# Patient Record
Sex: MALE | Race: WHITE | NOT HISPANIC OR LATINO | URBAN - METROPOLITAN AREA
[De-identification: names, ages, dates, MRNs, and addresses within clinical notes are randomized per-mention and may not be internally consistent; named-entity substitution may affect disease eponyms.]

---

## 2017-10-09 ENCOUNTER — TRANSFERRED RECORDS (OUTPATIENT)
Dept: HEALTH INFORMATION MANAGEMENT | Facility: CLINIC | Age: 22
End: 2017-10-09

## 2017-10-10 ENCOUNTER — MEDICAL CORRESPONDENCE (OUTPATIENT)
Dept: HEALTH INFORMATION MANAGEMENT | Facility: CLINIC | Age: 22
End: 2017-10-10

## 2017-10-11 NOTE — TELEPHONE ENCOUNTER
APPT INFORMATION    Date & Time: 10/18/17 8:30AM   Reason for Appt: Rt Shoulder Impingement    Referring Name/Clinic: Dr. Moi Sinha - [facility]    Yes / No COMMENT / NOTES DATE & ACTION   Patient Contacted? no Pt is referred        RECORDS CLINIC NAME  (use N/A if no records ) DATE & ACTION RECEIVED RECS & IMG? Y/N   (may include other helpful notes)   External Clinics: [facility] 10/11/17 Faxed request for records    10/11/17 2:06pm - Records scanned in Epic under MEDIA Tab. Notified Leisa to pull XR R Shoulder 10/9/17 Records received.     Internal Clinics: n/a

## 2017-10-14 ENCOUNTER — HEALTH MAINTENANCE LETTER (OUTPATIENT)
Age: 22
End: 2017-10-14

## 2017-10-14 ASSESSMENT — ENCOUNTER SYMPTOMS
JOINT SWELLING: 0
BACK PAIN: 0
MUSCLE WEAKNESS: 1
MYALGIAS: 0
NECK PAIN: 0
STIFFNESS: 1
MUSCLE CRAMPS: 0
ARTHRALGIAS: 1

## 2017-10-18 ENCOUNTER — OFFICE VISIT (OUTPATIENT)
Dept: ORTHOPEDICS | Facility: CLINIC | Age: 22
End: 2017-10-18

## 2017-10-18 ENCOUNTER — PRE VISIT (OUTPATIENT)
Dept: ORTHOPEDICS | Facility: CLINIC | Age: 22
End: 2017-10-18

## 2017-10-18 VITALS — HEIGHT: 74 IN | WEIGHT: 221.2 LBS | BODY MASS INDEX: 28.39 KG/M2

## 2017-10-18 DIAGNOSIS — M25.611 STIFFNESS OF RIGHT SHOULDER JOINT: Primary | ICD-10-CM

## 2017-10-18 NOTE — MR AVS SNAPSHOT
After Visit Summary   10/18/2017    Kavon Mishra    MRN: [id number]           Patient Information     Date Of Birth          1995        Visit Information        Provider Department      10/18/2017 8:30 AM Aislinn Zapien MD [facility]        Today's Diagnoses     Stiffness of right shoulder joint    -  1       Follow-ups after your visit        Additional Services     PHYSICAL THERAPY REFERRAL (Internal)       Physical Therapy Referral                  Your next 10 appointments already scheduled     Nov 07, 2017  5:30 PM CST   (Arrive by 5:15 PM)   MR UPPER EXTREMITY JOINT RIGHT W/O CONTRAST with [id number]   [facility] ([facility])    [address]  [facility] [address] [phone]           Take your medicines as usual, unless your doctor tells you not to. Bring a list of your current medicines to your exam (including vitamins, minerals and over-the-counter drugs). Also bring the results of similar scans you may have had.  Please remove any body piercings and hair extensions before you arrive.  Follow your doctor s orders. If you do not, we may have to postpone your exam.  You will not have contrast for this exam. You do not need to do anything special to prepare.  The MRI machine uses a strong magnet. Please wear clothes without metal (snaps, zippers). A sweatsuit works well, or we may give you a hospital gown.   **IMPORTANT** THE INSTRUCTIONS BELOW ARE ONLY FOR THOSE PATIENTS WHO HAVE BEEN TOLD THEY WILL RECEIVE SEDATION OR GENERAL ANESTHESIA DURING THEIR MRI PROCEDURE:  IF YOU WILL RECEIVE SEDATION (take medicine to help you relax during your exam):   You must get the medicine from your doctor before you arrive. Bring the medicine to the exam. Do not take it at home.   Arrive one hour early. Bring someone who can take you home after the test. Your medicine will make you sleepy. After the exam,  you may not drive, take a bus or take a taxi by yourself.   No eating 8 hours before your exam. You may have clear liquids up until 4 hours before your exam. (Clear liquids include water, clear tea, black coffee and fruit juice without pulp.)  IF YOU WILL RECEIVE ANESTHESIA (be asleep for your exam):   Arrive 1 1/2 hours early. Bring someone who can take you home after the test. You may not drive, take a bus or take a taxi by yourself.   No eating 8 hours before your exam. You may have clear liquids up until 4 hours before your exam. (Clear liquids include water, clear tea, black coffee and fruit juice without pulp.)   You will spend four to five hours in the recovery room.  Please call the Imaging Department at your exam site with any questions.            Nov 09, 2017 10:00 AM CST   BREND[facility] Extremity with LIANNA Cao [facility] ([facility])    [address] [place] [address] [phone]            Nov 16, 2017 10:00 AM CST   [facility] Extremity with LIANNA Cao [facility] ([facility])    [address] [place] [address] [phone]              Who to contact     Please call your clinic at [phone] to:    Ask questions about your health    Make or cancel appointments    Discuss your medicines    Learn about your test results    Speak to your doctor   If you have compliments or concerns about an experience at your clinic, or if you wish to file a complaint, please contact [facility] Patient Relations at [phone] or email us at [email or web address]         Additional Information About Your Visit        MyChart Information     [employer] gives you secure access to your electronic health record. If you see a primary care provider, you can also send messages to your care team and make appointments. If you  "have questions, please call your primary care clinic.  If you do not have a primary care provider, please call [phone] and they will assist you.      [employer] is an electronic gateway that provides easy, online access to your medical records. With [employer], you can request a clinic appointment, read your test results, renew a prescription or communicate with your care team.     To access your existing account, please contact your [facility] or call [phone] for assistance.        Care EveryWhere ID     This is your Care EveryWhere ID. This could be used by other organizations to access your [facility] medical records  [id number]        Your Vitals Were     Height BMI (Body Mass Index)                1.88 m (6' 2\") 28.4 kg/m2           Blood Pressure from Last 3 Encounters:   No data found for BP    Weight from Last 3 Encounters:   10/18/17 100.3 kg (221 lb 3.2 oz)              We Performed the Following     PHYSICAL THERAPY REFERRAL (Internal)        Primary Care Provider    None Specified       [facility] [address] [address]        Equal Access to Services     [facility]: Hadii aad ku hadasho Soomaali, waaxda luqadaha, qaybta kaalmada adeegyada, waxay aurorain hayjocelyne fontaine . So [facility] [phone].    ATENCIÓN: Si habla español, tiene a moffett disposición servicios gratuitos de asistencia lingüística. Llame al [phone].    We comply with applicable federal civil rights laws and [place] laws. We do not discriminate on the basis of race, color, national origin, age, disability, sex, sexual orientation, or gender identity.            Thank you!     Thank you for choosing [facility]THOP[facility]  for your care. Our goal is always to provide you with excellent care. Hearing back from our patients is one way we can continue to improve our services. Please take a few minutes to complete the written survey that you may receive in the mail " after your visit with us. Thank you!             Your Updated Medication List - Protect others around you: Learn how to safely use, store and throw away your medicines at www.disposemymeds.org.      Notice  As of 10/18/2017 11:59 PM    You have not been prescribed any medications.

## 2017-10-18 NOTE — LETTER
Verification of Appointment  2017     Seen today: yes    Patient:  Kavon Mishra  :   1995  Physician: Aislinn Zapien MD      Kavon Mishra is under our care for his right shoulder.   He is undergoing directed physical therapy for treatment of the right shoulder limitations.  Current restrictions include: No shoulder height or overhead activity. No push-ups, pull-ups      Aislinn Zapien MD

## 2017-10-18 NOTE — LETTER
10/18/2017      RE: Kavon Mishra  [address] [address] [place] [id number]       [facility], Orthopaedic Surgery Consultation    Kavon Mishra MRN# [id number]   Age: 21 year old YOB: 1995     Reason for consult: Right shoulder pain       Requesting physician: Moi Sinha MD         Assessment and Plan:   Assessment:   1. Right shoulder stiffness    Plan:  We reviewed the history, imaging and physical exam findings with the patient and described them using lay language. We discussed the finding of right shoulder stiffness on today's exam. The patient's age and history make both impingement and rotator cuff pathology very unlikely. The patient has significant shoulder stiffness with both active and passive ROM, which is likely contributing to his pain. We cannot rule out labral or biceps pathology, which would require an MR arthrogram to determine. However, we would not likely address either of these issues in the context of his profound stiffness which will require dedicated PT first. Pain will likely improve with dedicated PT for stiffness. If he does not improve with PT, we can perform advanced imaging at that time. He was provided a note including limitations for [facility]. The assessment and plan was discussed with the patient who understands and agrees.    Dalton Holbrook MD  Orthopedic Surgery, PGY-1    Patient was seen and discussed with Dr. Zapien            History of Present Illness:   Chief Concern: Right shoulder pain, loss of ROM    This is an otherwise healthy RHD male who presents with right shoulder pain since the first week in August with progressive loss of shoulder ROM and increasing pain. He is in [facility] and participated in  training in July, pain started about a week after returning home. Patient denies trauma or injury to the shoulder, but the patient was doing a lot of upper body exercise and lifting throughout training. He initially treated the shoulder  with rest, ice, and NSAIDs with minimal relief. He noticed worsening ROM and was evaluated at [facility], who recommended 1 month of PT. He denies improvement from PT with no change in his ROM or pain level. PT was done at [place] and consisted of Codman's exercises and some rotator cuff strengthening but not much in the way of stretching per pt. He has never received an injection into the shoulder. Denies any history of shoulder trauma or prior pain, no history of dislocation or problems with the shoulder (played tennis and hockey in high school). On 1 finger test, the patient points to his anterior glenohumeral joint as the primary area of pain. He has pain with any ROM of the shoulder above his waist.     Denies history of diabetes or thyroid disease. Patient is in [employer] and will be in the [employer] after graduating. Hopes to go to [employer].       Patient was seen and examined by me. History, PMH, Meds, SH, complete ROS (10 organ systems) and PE reviewed with patient and prior medical records.         Past Medical History:   No past medical history on file.          Past Surgical History:   No past surgical history on file.          Social History:     Social History     Social History     Marital status: Single     Spouse name: N/A     Number of children: N/A     Years of education: N/A     Social History Main Topics     Smoking status: Not on file     Smokeless tobacco: Not on file     Alcohol use Not on file     Drug use: Not on file     Sexual activity: Not on file     Other Topics Concern     Not on file     Social History Narrative             Family History:   No family history on file.           Medications:     No current outpatient prescriptions on file.     No current facility-administered medications for this visit.              Allergies:    Not on File         Review of Systems:   A comprehensive 10 point review of systems (constitutional, ENT, cardiac, peripheral vascular, respiratory,  "GI, , Musculoskeletal, skin, Neurological) was performed and found to be negative except as described in this note.           Physical Exam:   COMPLETE EXAMINATION:   VITAL SIGNS: Ht 1.88 m (6' 2\")  Wt 100.3 kg (221 lb 3.2 oz)  BMI 28.4 kg/m2  RESP: Non labored breathing  MUSCULOSKELETAL:.      RUE:  Skin intact. No erythema. No prior scars. No shoulder asymmetry compared with contralateral shoulder. No muscle atrophy. TTP over AC joint, coracoid process, and anterior and lateral shoulder. No TTP over clavicle, arm, elbow, forearm. SILT in axillary, musculocutaneous, median, ulnar, and radial nerve distributions. EPL, FPL, and intrinsics intact. AAROM shoulder , ER 65, IR to belt, and abduction 80 with discomfort at end range (he is resistant to attempting passive ROM beyond this due to pain). Patient painful with most provocative maneuvers due to shoulder stiffness - pain with empty can, resisted external rotation, Speed's, Yergason and Myers with preserved strength throughout. Radial pulse palpable.     LUE: Active shoulder ROM , ER 85, Abd 110, IR to T6.          Data:   Imaging:  XR R shoulder: no osseous abnormality.      I have personally examined this patient and have reviewed the clinical presentation and progress note with the resident.  I agree with the treatment plan as outlined.  The plan was formulated with the resident on the day of the resident's note.       Aislinn Zapien MD      "

## 2017-10-18 NOTE — NURSING NOTE
"Reason For Visit:   Chief Complaint   Patient presents with     Consult     Righ shoulder pain, limited ROM and instability.        PCP: No primary care provider on file.  Ref: Dr Moi Sinha    ?  No  Occupation Student full-time.  Currently working? No.  Date of injury: none  Date of surgery: none  Smoker: No      Right hand dominant    SANE score  Affected shoulder: Right  Right shoulder SANE: 25  Left shoulder SANE: 100    Ht 1.88 m (6' 2\")  Wt 100.3 kg (221 lb 3.2 oz)  BMI 28.4 kg/m2      Pain Assessment  Patient Currently in Pain: Yes  0-10 Pain Scale: 5  Primary Pain Location: Shoulder  Pain Orientation: Right    Rosa Duckworth LPN      "

## 2017-10-18 NOTE — PROGRESS NOTES
[facility], Orthopaedic Surgery Consultation    Kavon Mishra MRN# [id number]   Age: 21 year old YOB: 1995     Reason for consult: Right shoulder pain       Requesting physician: Moi Sinha MD         Assessment and Plan:   Assessment:   1. Right shoulder stiffness    Plan:  We reviewed the history, imaging and physical exam findings with the patient and described them using lay language. We discussed the finding of right shoulder stiffness on today's exam. The patient's age and history make both impingement and rotator cuff pathology very unlikely. The patient has significant shoulder stiffness with both active and passive ROM, which is likely contributing to his pain. We cannot rule out labral or biceps pathology, which would require an MR arthrogram to determine. However, we would not likely address either of these issues in the context of his profound stiffness which will require dedicated PT first. Pain will likely improve with dedicated PT for stiffness. If he does not improve with PT, we can perform advanced imaging at that time. He was provided a note including limitations for [facility]. The assessment and plan was discussed with the patient who understands and agrees.    Dalton Holbrook MD  Orthopedic Surgery, PGY-1    Patient was seen and discussed with Dr. Zapien            History of Present Illness:   Chief Concern: Right shoulder pain, loss of ROM    This is an otherwise healthy RHD male who presents with right shoulder pain since the first week in August with progressive loss of shoulder ROM and increasing pain. He is in [facility] and participated in  training in July, pain started about a week after returning home. Patient denies trauma or injury to the shoulder, but the patient was doing a lot of upper body exercise and lifting throughout training. He initially treated the shoulder with rest, ice, and NSAIDs with minimal relief. He noticed worsening ROM and was evaluated  at [facility], who recommended 1 month of PT. He denies improvement from PT with no change in his ROM or pain level. PT was done at [place] and consisted of Codman's exercises and some rotator cuff strengthening but not much in the way of stretching per pt. He has never received an injection into the shoulder. Denies any history of shoulder trauma or prior pain, no history of dislocation or problems with the shoulder (played tennis and hockey in high school). On 1 finger test, the patient points to his anterior glenohumeral joint as the primary area of pain. He has pain with any ROM of the shoulder above his waist.     Denies history of diabetes or thyroid disease. Patient is in [employer] and will be in the [employer] after graduating. Hopes to go to [employer].       Patient was seen and examined by me. History, PMH, Meds, SH, complete ROS (10 organ systems) and PE reviewed with patient and prior medical records.         Past Medical History:   No past medical history on file.          Past Surgical History:   No past surgical history on file.          Social History:     Social History     Social History     Marital status: Single     Spouse name: N/A     Number of children: N/A     Years of education: N/A     Social History Main Topics     Smoking status: Not on file     Smokeless tobacco: Not on file     Alcohol use Not on file     Drug use: Not on file     Sexual activity: Not on file     Other Topics Concern     Not on file     Social History Narrative             Family History:   No family history on file.           Medications:     No current outpatient prescriptions on file.     No current facility-administered medications for this visit.              Allergies:    Not on File         Review of Systems:   A comprehensive 10 point review of systems (constitutional, ENT, cardiac, peripheral vascular, respiratory, GI, , Musculoskeletal, skin, Neurological) was performed and found to be negative except as  "described in this note.           Physical Exam:   COMPLETE EXAMINATION:   VITAL SIGNS: Ht 1.88 m (6' 2\")  Wt 100.3 kg (221 lb 3.2 oz)  BMI 28.4 kg/m2  RESP: Non labored breathing  MUSCULOSKELETAL:.      RUE:  Skin intact. No erythema. No prior scars. No shoulder asymmetry compared with contralateral shoulder. No muscle atrophy. TTP over AC joint, coracoid process, and anterior and lateral shoulder. No TTP over clavicle, arm, elbow, forearm. SILT in axillary, musculocutaneous, median, ulnar, and radial nerve distributions. EPL, FPL, and intrinsics intact. AAROM shoulder , ER 65, IR to belt, and abduction 80 with discomfort at end range (he is resistant to attempting passive ROM beyond this due to pain). Patient painful with most provocative maneuvers due to shoulder stiffness - pain with empty can, resisted external rotation, Speed's, Yergason and Myers with preserved strength throughout. Radial pulse palpable.     LUE: Active shoulder ROM , ER 85, Abd 110, IR to T6.          Data:   Imaging:  XR R shoulder: no osseous abnormality.      I have personally examined this patient and have reviewed the clinical presentation and progress note with the resident.  I agree with the treatment plan as outlined.  The plan was formulated with the resident on the day of the resident's note.     "

## 2017-10-23 ENCOUNTER — THERAPY VISIT (OUTPATIENT)
Dept: PHYSICAL THERAPY | Facility: CLINIC | Age: 22
End: 2017-10-23
Payer: COMMERCIAL

## 2017-10-23 DIAGNOSIS — G25.89 SCAPULAR DYSKINESIS: ICD-10-CM

## 2017-10-23 DIAGNOSIS — M75.41 IMPINGEMENT SYNDROME, SHOULDER, RIGHT: ICD-10-CM

## 2017-10-23 DIAGNOSIS — M25.511 SHOULDER PAIN, RIGHT: Primary | ICD-10-CM

## 2017-10-23 PROCEDURE — 97161 PT EVAL LOW COMPLEX 20 MIN: CPT | Mod: GP | Performed by: PHYSICAL THERAPIST

## 2017-10-23 PROCEDURE — 97112 NEUROMUSCULAR REEDUCATION: CPT | Mod: GP | Performed by: PHYSICAL THERAPIST

## 2017-10-23 NOTE — MR AVS SNAPSHOT
After Visit Summary   10/23/2017    Kavon Banguradozelalem    MRN: [id number]           Patient Information     Date Of Birth          1995        Visit Information        Provider Department      10/23/2017 1:50 PM Bennett Daniels PT [facility]        Today's Diagnoses     Shoulder pain, right    -  1    Scapular dyskinesis        Impingement syndrome, shoulder, right           Follow-ups after your visit        Your next 10 appointments already scheduled     Oct 30, 2017  3:10 PM CDT   [facility] Extremity with LIANNA Cao [facility] ([facility])    [address] [place] [id number] [phone]            Nov 09, 2017 10:00 AM CST   [facility] Extremity with LIANNA Cao [facility] ([facility])    [address] [place] [id number] [phone]            Nov 16, 2017 10:00 AM CST   BR[facility] Extremity with LIANNA Cao [facility] ([facility])    [address] [place] [address] [phone]              Who to contact     If you have questions or need follow up information about today's clinic visit or your schedule please contact [facility] PHYSICAL THERAP[facility] directly at [phone].  Normal or non-critical lab and imaging results will be communicated to you by MyChart, letter or phone within 4 business days after the clinic has received the results. If you do not hear from us within 7 days, please contact the clinic through MyChart or phone. If you have a critical or abnormal lab result, we will notify you by phone as soon as possible.  Submit refill requests through [employer] or call your pharmacy and they will forward the refill request to us. Please allow 3 business days for your refill to be completed.          Additional  Information About Your Visit        [employer] Information     [employer] gives you secure access to your electronic health record. If you see a primary care provider, you can also send messages to your care team and make appointments. If you have questions, please call your primary care clinic.  If you do not have a primary care provider, please call [phone] and they will assist you.        Care EveryWhere ID     This is your Care EveryWhere ID. This could be used by other organizations to access your [facility] medical records  [id number]         Blood Pressure from Last 3 Encounters:   No data found for BP    Weight from Last 3 Encounters:   10/18/17 100.3 kg (221 lb 3.2 oz)              We Performed the Following     HC PT EVAL, LOW COMPLEXITY     [facility] INITIAL EVAL REPORT     NEUROMUSCULAR RE-EDUCATION        Primary Care Provider Office Phone # Fax #    Kavon H [facility] [phone]       [facility] [address] [address]        Equal Access to Services     JESSICA MORATAYA : Hadii aad ku hadasho Soomaali, waaxda luqadaha, qaybta kaalmada adeegyada, waxay idiin haymarceln chivo fontaine . So [facility] [phone].    ATENCIÓN: Si habla español, tiene a moffett disposición servicios gratuitos de asistencia lingüística. Llame al [phone].    We comply with applicable federal civil rights laws and [place] laws. We do not discriminate on the basis of race, color, national origin, age, disability, sex, sexual orientation, or gender identity.            Thank you!     Thank you for choosing [facility] PHYSICAL THERAPY [facility]  for your care. Our goal is always to provide you with excellent care. Hearing back from our patients is one way we can continue to improve our services. Please take a few minutes to complete the written survey that you may receive in the mail after your visit with us. Thank you!             Your Updated Medication List - Protect others around you: Learn how to safely  use, store and throw away your medicines at www.disposemymeds.org.      Notice  As of 10/23/2017 11:59 PM    You have not been prescribed any medications.

## 2017-10-27 ENCOUNTER — TELEPHONE (OUTPATIENT)
Dept: ORTHOPEDICS | Facility: CLINIC | Age: 22
End: 2017-10-27

## 2017-10-27 PROBLEM — M25.511 SHOULDER PAIN, RIGHT: Status: ACTIVE | Noted: 2017-10-27

## 2017-10-27 PROBLEM — M75.41 IMPINGEMENT SYNDROME, SHOULDER, RIGHT: Status: ACTIVE | Noted: 2017-10-27

## 2017-10-27 PROBLEM — G25.89 SCAPULAR DYSKINESIS: Status: ACTIVE | Noted: 2017-10-27

## 2017-10-27 NOTE — PROGRESS NOTES
Subjective:    Patient is a 21 year old male presenting with rehab right shoulder hpi.   Kavon Mishra is a 21 year old male with a right shoulder condition.  Condition occurred with:  Unknown cause (possible related to overuse when at  training).  Condition occurred: for unknown reasons.  This is a new condition  8/5/17  .    Patient reports pain:  Lateral and anterior.    Pain is described as sharp and aching and is intermittent and reported as 10/10 and 3/10.  Associated symptoms:  Loss of motion/stiffness and loss of strength. Pain is the same all the time.  Symptoms are exacerbated by using arm behind back, using arm at shoulder level, using arm overhead, carrying and lifting (dressing, bathing) and relieved by rest.  Since onset symptoms are unchanged.  Special testing: none.  Previous treatment includes physical therapy.  There was no improvement following previous treatment.  General health as reported by patient is excellent.  Pertinent medical history includes:  None.  Medical allergies: no.  Other surgeries include:  None reported.  Current medications:  None as reported by the patient.  Current occupation is Student, [employer]  .  Patient is working in normal job without restrictions.      Barriers include:  None as reported by the patient.    Red flags:  None as reported by the patient.                        Objective:    Standing Alignment:      Shoulder/UE:  Depressed scapula R, scapular winging R and protracted scapula R (medial border scapular winging with active ROM)              Gait:    Gait Type:  Normal         Flexibility/Screens:   Positive screens:  Shoulder        Upper Extremity: normal                       Shoulder Evaluation:  ROM:  AROM:    Flexion:  Left:  Wnl    Right:  60 (pain limiting - no scapular rotation)    Abduction:  Left: wnl   Right:  60 (pain limiting - no scapular rotation)    Internal Rotation:  Left:  Wnl    Right:  Limited  External Rotation:  Left:  Wnl     Right:  30                PROM:    Flexion:  Left:  Wnl    Right: 170 (pain at end range)      Abduction:  Left:  Wnl    Right:  170 (pain at end range)    Internal Rotation:  Left:  Wnl    Right:  50  External Rotation:  Left:  Wnl    Right:  90 (pain at end range                    Strength:    Flexion: Left:5/5   Pain:    Right: /5    Pain:  +++    Abduction:  Left: 5/5  Pain:    Right: 2/5      Pain:+++    Internal Rotation:  Left:5/5     Pain:    Right: 3/5      Pain:++  External Rotation:   Left:5/5     Pain:   Right:3/5      Pain:++            Stability Testing:        Right shoulder stability negative testing:  Apprehension; Sulcus sign; Load and shift anterior and Load and shift posterior  Special Tests:      Right shoulder positive for the following special tests:Impingement  Right shoulder negative for the following special tests:Rotator cuff tear  Palpation:      Right shoulder tenderness present at: Biceps; Supraspinatus; Infraspinatus; Levator; Upper Trap and Bicipital Groove  Right shoulder tenderness not present at:Clavicle; Acrimioclavicular; Teres Minor; Subscapularis; Deltoid or Rhomboids  Mobility Tests:    Glenohumeral anterior right:  Normal  Glenohumeral posterior right:  Normal  Glenohumeral inferior right:  Normal                                             General     ROS    Assessment/Plan:      Patient is a 21 year old male with right side shoulder complaints.    Patient has the following significant findings with corresponding treatment plan.                Diagnosis 1:  Right Shoulder Pain, Scapular Diskinesis with Medial Border Winging, secondary PRIYA    Pain -  hot/cold therapy, self management, education and home program  Decreased ROM/flexibility - manual therapy and therapeutic exercise  Decreased strength - therapeutic exercise and therapeutic activities  Impaired muscle performance - neuro re-education  Decreased function - therapeutic activities  Impaired posture - neuro  re-education    Therapy Evaluation Codes:   1) History comprised of:   Personal factors that impact the plan of care:      Time since onset of symptoms and Work status.    Comorbidity factors that impact the plan of care are:      None.     Medications impacting care: None.  2) Examination of Body Systems comprised of:   Body structures and functions that impact the plan of care:      Shoulder and scapula.   Activity limitations that impact the plan of care are:      Bathing, Bending, Cooking, Driving, Dressing, Lifting, Sleeping and Laying down.  3) Clinical presentation characteristics are:   Evolving, changing  4) Decision-Making    Moderate complexity using standardized patient assessment instrument and/or measureable assessment of functional outcome.  Cumulative Therapy Evaluation is: Moderate complexity.    Previous and current functional limitations:  (See Goal Flow Sheet for this information)    Short term and Long term goals: (See Goal Flow Sheet for this information)     Communication ability:  Patient appears to be able to clearly communicate and understand verbal and written communication and follow directions correctly.  Treatment Explanation - The following has been discussed with the patient:   RX ordered/plan of care  Anticipated outcomes  Possible risks and side effects  This patient would benefit from PT intervention to resume normal activities.   Rehab potential is questionable.    Frequency:  1 X week, once daily  Duration:  for 8 weeks  Discharge Plan:  Achieve all LTG.  Independent in home treatment program.  Reach maximal therapeutic benefit.    Please refer to the daily flowsheet for treatment today, total treatment time and time spent performing 1:1 timed codes.

## 2017-10-27 NOTE — TELEPHONE ENCOUNTER
Dr Zapien requests patient get a right shoulder MRI.  She states right scapular winging and protracted scapula were noted during physical therapy.    Message left on patient's voicemail to call the clinic concerning the order.

## 2017-10-30 ENCOUNTER — THERAPY VISIT (OUTPATIENT)
Dept: PHYSICAL THERAPY | Facility: CLINIC | Age: 22
End: 2017-10-30
Payer: COMMERCIAL

## 2017-10-30 DIAGNOSIS — G25.89 SCAPULAR DYSKINESIS: ICD-10-CM

## 2017-10-30 DIAGNOSIS — M75.41 IMPINGEMENT SYNDROME, SHOULDER, RIGHT: ICD-10-CM

## 2017-10-30 DIAGNOSIS — M25.511 SHOULDER PAIN, RIGHT: ICD-10-CM

## 2017-10-30 DIAGNOSIS — M95.8 WINGED SCAPULA, RIGHT: Primary | ICD-10-CM

## 2017-10-30 PROCEDURE — 97112 NEUROMUSCULAR REEDUCATION: CPT | Mod: GP | Performed by: PHYSICAL THERAPIST

## 2017-11-09 ENCOUNTER — THERAPY VISIT (OUTPATIENT)
Dept: PHYSICAL THERAPY | Facility: CLINIC | Age: 22
End: 2017-11-09
Payer: COMMERCIAL

## 2017-11-09 DIAGNOSIS — M75.41 IMPINGEMENT SYNDROME, SHOULDER, RIGHT: ICD-10-CM

## 2017-11-09 DIAGNOSIS — G25.89 SCAPULAR DYSKINESIS: ICD-10-CM

## 2017-11-09 DIAGNOSIS — M25.511 SHOULDER PAIN, RIGHT: ICD-10-CM

## 2017-11-09 PROCEDURE — 97110 THERAPEUTIC EXERCISES: CPT | Mod: GP | Performed by: PHYSICAL THERAPIST

## 2017-11-09 PROCEDURE — 97112 NEUROMUSCULAR REEDUCATION: CPT | Mod: GP | Performed by: PHYSICAL THERAPIST

## 2017-11-15 ENCOUNTER — OFFICE VISIT (OUTPATIENT)
Dept: ORTHOPEDICS | Facility: CLINIC | Age: 22
End: 2017-11-15

## 2017-11-15 DIAGNOSIS — M95.8 WINGED SCAPULA OF RIGHT SIDE: ICD-10-CM

## 2017-11-15 DIAGNOSIS — G54.5 PARSONAGE-TURNER SYNDROME: Primary | ICD-10-CM

## 2017-11-15 NOTE — NURSING NOTE
Reason For Visit:   Chief Complaint   Patient presents with     RECHECK     Follow up for Right shoulder MRI       PCP: Kavon Chávez  Ref: Self    ?  No  Occupation Student at the .  Currently working? No.  Work status?  Student.  Date of injury: ongoing since early august    Date of surgery: NA  Type of surgery: NA.  Smoker: No  Request smoking cessation information: No    Right hand dominant    SANE score  Affected shoulder: Right  Right shoulder SANE: 15-20  Left shoulder SANE:     There were no vitals taken for this visit.      Pain Assessment  Patient Currently in Pain: Yes  0-10 Pain Scale:  (6-7)  Primary Pain Location: Shoulder  Pain Orientation: Right  Pain Descriptors: Dull, Aching, Sharp, Stabbing, Radiating  Alleviating Factors: Rest  Aggravating Factors: Exercise, Movement, Reaching    Domenica Mauro ATC

## 2017-11-15 NOTE — LETTER
11/15/2017       RE: Kavon Mishra  [address] [facility] [address]     Dear Colleague,    Thank you for referring your patient, Kavon Mishra, to the [facility] at [facility]. Please see a copy of my visit note below.    CHIEF CONCERN:  Right shoulder dysfunction    HISTORY OF PRESENT ILLNESS:  Kavon is a 21 year old young man who returns today to review his shoulder progress and recent MRI. He is accompanied by his mother. He has been working with PT and his passive motion has been reasonable but his active motion is of concern - exam and history suggestive of possible Parsonage Rios. He has had significant scapular winging.     PHYSICAL EXAM:    Young adult male in NAD. Accompanied by his mother  Respirations even and unlabored.  Focused upper extremity exam: RUE: Skin intact. No erythema. No prior scars. No shoulder asymmetry at rest. No obvious atrophy. No pain on palpation over shoulder over AC joint, coracoid process, and anterior and lateral shoulder. SILT in axillary, musculocutaneous, median, ulnar, and radial nerve distributions. EPL, FPL, and intrinsics intact. AROM is FE to 70, ER at side to 70 and IR to L2. PROM shoulder is further limited by discomfort but with FE to 120, ER 65. Strength in FE is at most 3/5. ER strength is 4/5. When patient attempts active FE he has rather pronounced Medial scapular winging. This is also brought out by wall push-ups. When scapula is stabilized by me active FE improves slightly to perhaps 100 deg.     IMAGING:  Right shoulder MRI 11/7/17  IMPRESSION:  1. No full-thickness tear or tendon retraction involving the right shoulder rotator cuff tendons.  2. Normal muscle bulk of the right shoulder rotator cuff musculature.  3. Normal-appearing biceps tendon.  4. Markedly diminutive appearance of the posterior superior labrum.    ASSESSMENT:  1. Right shoulder pain  2. Right shoulder scapular  winging  3. 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 We talked about this as a diagnosis of exclusion. We discussed obtaining an EMG to determine if there are any findings of plexus or long thoracic nerve. We discussed that if cause is brachial neuritis timeline for recovery can be lengthy but majority of cases resolve with supportive tx (such as PT). Can add TENS/Estim. Will order EMG and review when results available.  Patient will continue with PT and call this clinic with any questions.      Again, thank you for allowing me to participate in the care of your patient.      Sincerely,    Aislinn Zapien MD

## 2017-11-15 NOTE — MR AVS SNAPSHOT
After Visit Summary   11/15/2017    Kavon Mishra    MRN: [id number]           Patient Information     Date Of Birth          1995        Visit Information        Provider Department      11/15/2017 9:45 AM Aislinn Zapien MD [facility]        Today's Diagnoses     Parsonage-Rios syndrome    -  1    Winged scapula of right side           Follow-ups after your visit        Your next 10 appointments already scheduled     Dec 07, 2017 10:00 AM CST   [facility] Extremity with LIANNA Cao [facility] Physical Therapy [facility] ([facility])    [address] [place] [address] [phone]              Who to contact     Please call your clinic at [phone] to:    Ask questions about your health    Make or cancel appointments    Discuss your medicines    Learn about your test results    Speak to your doctor   If you have compliments or concerns about an experience at your clinic, or if you wish to file a complaint, please contact [facility] Patient Relations at [phone] or email us at [email or web address]         Additional Information About Your Visit        MyChart Information     [employer] gives you secure access to your electronic health record. If you see a primary care provider, you can also send messages to your care team and make appointments. If you have questions, please call your primary care clinic.  If you do not have a primary care provider, please call [phone] and they will assist you.      Vsnap is an electronic gateway that provides easy, online access to your medical records. With Vsnap, you can request a clinic appointment, read your test results, renew a prescription or communicate with your care team.     To access your existing account, please contact your [facility] or call [phone] for assistance.        Care  EveryWhere ID     This is your Care EveryWhere ID. This could be used by other organizations to access your [place] medical records  [id number]         Blood Pressure from Last 3 Encounters:   No data found for BP    Weight from Last 3 Encounters:   10/18/17 100.3 kg (221 lb 3.2 oz)               Primary Care Provider Office Phone # Fax #    Kavon H [facility] [phone]       [facility] [address] [address]        Equal Access to Services     LYRICWest Los Angeles Memorial HospitalARJUN : Hadii aad ku hadasho Soomaali, waaxda luqadaha, qaybta kaalmada adeegyada, waxay idiin hayaan adeeg janay fontaine . So [facility] [phone].    ATENCIÓN: Si habla español, tiene a moffett disposición servicios gratuitos de asistencia lingüística. [facility]duBlanch[facility] System [phone].    We comply with applicable federal civil rights laws and [place] laws. We do not discriminate on the basis of race, color, national origin, age, disability, sex, sexual orientation, or gender identity.            Thank you!     Thank you for choosing [facility]  for your care. Our goal is always to provide you with excellent care. Hearing back from our patients is one way we can continue to improve our services. Please take a few minutes to complete the written survey that you may receive in the mail after your visit with us. Thank you!             Your Updated Medication List - Protect others around you: Learn how to safely use, store and throw away your medicines at www.disposemymeds.org.      Notice  As of 11/15/2017 11:59 PM    You have not been prescribed any medications.

## 2017-11-16 ENCOUNTER — THERAPY VISIT (OUTPATIENT)
Dept: PHYSICAL THERAPY | Facility: CLINIC | Age: 22
End: 2017-11-16
Payer: COMMERCIAL

## 2017-11-16 DIAGNOSIS — M25.511 SHOULDER PAIN, RIGHT: ICD-10-CM

## 2017-11-16 DIAGNOSIS — G25.89 SCAPULAR DYSKINESIS: ICD-10-CM

## 2017-11-16 DIAGNOSIS — M75.41 IMPINGEMENT SYNDROME, SHOULDER, RIGHT: ICD-10-CM

## 2017-11-16 PROCEDURE — 97112 NEUROMUSCULAR REEDUCATION: CPT | Mod: GP | Performed by: PHYSICAL THERAPIST

## 2017-11-16 PROCEDURE — 97014 ELECTRIC STIMULATION THERAPY: CPT | Mod: GP | Performed by: PHYSICAL THERAPIST

## 2017-11-16 NOTE — NURSING NOTE
[facility] calling to request an EMG order updated to say which extremity needs to be assessed.  Order updated and faxed to [phone].

## 2017-11-20 ENCOUNTER — THERAPY VISIT (OUTPATIENT)
Dept: PHYSICAL THERAPY | Facility: CLINIC | Age: 22
End: 2017-11-20
Payer: COMMERCIAL

## 2017-11-20 DIAGNOSIS — G25.89 SCAPULAR DYSKINESIS: ICD-10-CM

## 2017-11-20 DIAGNOSIS — M75.41 IMPINGEMENT SYNDROME, SHOULDER, RIGHT: ICD-10-CM

## 2017-11-20 DIAGNOSIS — M25.511 SHOULDER PAIN, RIGHT: ICD-10-CM

## 2017-11-20 PROCEDURE — 97014 ELECTRIC STIMULATION THERAPY: CPT | Mod: GP | Performed by: PHYSICAL THERAPIST

## 2017-11-20 PROCEDURE — 97112 NEUROMUSCULAR REEDUCATION: CPT | Mod: GP | Performed by: PHYSICAL THERAPIST

## 2017-11-30 ENCOUNTER — THERAPY VISIT (OUTPATIENT)
Dept: PHYSICAL THERAPY | Facility: CLINIC | Age: 22
End: 2017-11-30
Payer: COMMERCIAL

## 2017-11-30 DIAGNOSIS — M75.41 IMPINGEMENT SYNDROME, SHOULDER, RIGHT: ICD-10-CM

## 2017-11-30 DIAGNOSIS — G25.89 SCAPULAR DYSKINESIS: ICD-10-CM

## 2017-11-30 DIAGNOSIS — M25.511 SHOULDER PAIN, RIGHT: ICD-10-CM

## 2017-11-30 PROCEDURE — 97014 ELECTRIC STIMULATION THERAPY: CPT | Mod: GP | Performed by: PHYSICAL THERAPIST

## 2017-11-30 PROCEDURE — 97112 NEUROMUSCULAR REEDUCATION: CPT | Mod: GP | Performed by: PHYSICAL THERAPIST

## 2017-12-01 NOTE — PROGRESS NOTES
CHIEF CONCERN:  Right shoulder dysfunction    HISTORY OF PRESENT ILLNESS:  Kavon is a 21 year old young man who returns today to review his shoulder progress and recent MRI. He is accompanied by his mother. He has been working with PT and his passive motion has been reasonable but his active motion is of concern - exam and history suggestive of possible Parsonage Rios. He has had significant scapular winging.     PHYSICAL EXAM:    Young adult male in NAD. Accompanied by his mother  Respirations even and unlabored.  Focused upper extremity exam: RUE: Skin intact. No erythema. No prior scars. No shoulder asymmetry at rest. No obvious atrophy. No pain on palpation over shoulder over AC joint, coracoid process, and anterior and lateral shoulder. SILT in axillary, musculocutaneous, median, ulnar, and radial nerve distributions. EPL, FPL, and intrinsics intact. AROM is FE to 70, ER at side to 70 and IR to L2. PROM shoulder is further limited by discomfort but with FE to 120, ER 65. Strength in FE is at most 3/5. ER strength is 4/5. When patient attempts active FE he has rather pronounced Medial scapular winging. This is also brought out by wall push-ups. When scapula is stabilized by me active FE improves slightly to perhaps 100 deg.     IMAGING:  Right shoulder MRI 11/7/17  IMPRESSION:  1. No full-thickness tear or tendon retraction involving the right shoulder rotator cuff tendons.  2. Normal muscle bulk of the right shoulder rotator cuff musculature.  3. Normal-appearing biceps tendon.  4. Markedly diminutive appearance of the posterior superior labrum.    ASSESSMENT:  1. Right shoulder pain  2. Right shoulder scapular winging  3. 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 We talked about  this as a diagnosis of exclusion. We discussed obtaining an EMG to determine if there are any findings of plexus or long thoracic nerve. We discussed that if cause is brachial neuritis timeline for recovery can be lengthy but majority of cases resolve with supportive tx (such as PT). Can add TENS/Estim. Will order EMG and review when results available.  Patient will continue with PT and call this clinic with any questions.  Aislinn Zapien MD

## 2017-12-07 ENCOUNTER — THERAPY VISIT (OUTPATIENT)
Dept: PHYSICAL THERAPY | Facility: CLINIC | Age: 22
End: 2017-12-07
Payer: COMMERCIAL

## 2017-12-07 DIAGNOSIS — G25.89 SCAPULAR DYSKINESIS: ICD-10-CM

## 2017-12-07 DIAGNOSIS — M75.41 IMPINGEMENT SYNDROME, SHOULDER, RIGHT: ICD-10-CM

## 2017-12-07 DIAGNOSIS — M25.511 SHOULDER PAIN, RIGHT: ICD-10-CM

## 2017-12-07 PROCEDURE — 97110 THERAPEUTIC EXERCISES: CPT | Mod: GP | Performed by: PHYSICAL THERAPIST

## 2017-12-07 PROCEDURE — 97112 NEUROMUSCULAR REEDUCATION: CPT | Mod: GP | Performed by: PHYSICAL THERAPIST

## 2017-12-07 PROCEDURE — 97014 ELECTRIC STIMULATION THERAPY: CPT | Mod: GP | Performed by: PHYSICAL THERAPIST

## 2017-12-13 ENCOUNTER — THERAPY VISIT (OUTPATIENT)
Dept: PHYSICAL THERAPY | Facility: CLINIC | Age: 22
End: 2017-12-13
Payer: COMMERCIAL

## 2017-12-13 DIAGNOSIS — M75.41 IMPINGEMENT SYNDROME, SHOULDER, RIGHT: ICD-10-CM

## 2017-12-13 DIAGNOSIS — M25.511 SHOULDER PAIN, RIGHT: ICD-10-CM

## 2017-12-13 DIAGNOSIS — G25.89 SCAPULAR DYSKINESIS: ICD-10-CM

## 2017-12-13 PROCEDURE — 97112 NEUROMUSCULAR REEDUCATION: CPT | Mod: GP | Performed by: PHYSICAL THERAPY ASSISTANT

## 2017-12-13 PROCEDURE — 97110 THERAPEUTIC EXERCISES: CPT | Mod: GP | Performed by: PHYSICAL THERAPY ASSISTANT

## 2017-12-19 ENCOUNTER — TRANSFERRED RECORDS (OUTPATIENT)
Dept: HEALTH INFORMATION MANAGEMENT | Facility: CLINIC | Age: 22
End: 2017-12-19

## 2017-12-21 ENCOUNTER — THERAPY VISIT (OUTPATIENT)
Dept: PHYSICAL THERAPY | Facility: CLINIC | Age: 22
End: 2017-12-21
Payer: COMMERCIAL

## 2017-12-21 DIAGNOSIS — M75.41 IMPINGEMENT SYNDROME, SHOULDER, RIGHT: ICD-10-CM

## 2017-12-21 DIAGNOSIS — M25.511 SHOULDER PAIN, RIGHT: ICD-10-CM

## 2017-12-21 DIAGNOSIS — G25.89 SCAPULAR DYSKINESIS: ICD-10-CM

## 2017-12-21 PROCEDURE — 97112 NEUROMUSCULAR REEDUCATION: CPT | Mod: GP | Performed by: PHYSICAL THERAPIST

## 2017-12-21 PROCEDURE — 97110 THERAPEUTIC EXERCISES: CPT | Mod: GP | Performed by: PHYSICAL THERAPIST

## 2018-01-15 ENCOUNTER — THERAPY VISIT (OUTPATIENT)
Dept: PHYSICAL THERAPY | Facility: CLINIC | Age: 23
End: 2018-01-15
Payer: COMMERCIAL

## 2018-01-15 DIAGNOSIS — M25.511 SHOULDER PAIN, RIGHT: ICD-10-CM

## 2018-01-15 DIAGNOSIS — M75.41 IMPINGEMENT SYNDROME, SHOULDER, RIGHT: ICD-10-CM

## 2018-01-15 DIAGNOSIS — G25.89 SCAPULAR DYSKINESIS: ICD-10-CM

## 2018-01-15 PROCEDURE — 97110 THERAPEUTIC EXERCISES: CPT | Mod: GP | Performed by: PHYSICAL THERAPIST

## 2018-01-15 PROCEDURE — 97112 NEUROMUSCULAR REEDUCATION: CPT | Mod: GP | Performed by: PHYSICAL THERAPIST

## 2018-01-15 NOTE — MR AVS SNAPSHOT
After Visit Summary   1/15/2018    Kavon Banguradozelalem    MRN: [id number]           Patient Information     Date Of Birth          1995        Visit Information        Provider Department      1/15/2018 4:30 PM Bennett Daniels PT M [facility] Physical Therapy [facility]        Today's Diagnoses     Shoulder pain, right        Scapular dyskinesis        Impingement syndrome, shoulder, right           Follow-ups after your visit        Your next 10 appointments already scheduled     Jan 22, 2018  2:30 PM CST   [facility] Extremity with LIANNA Cao [facility] Physical Therapy [facility] ([facility])    [address] [place] [address] [phone]            Jan 29, 2018 10:00 AM CST   [facility] Extremity with LIANNA Cao [facility] Physical Therapy [facility] ([facility])    [address] [place] [address] [phone]              Who to contact     If you have questions or need follow up information about today's clinic visit or your schedule please contact [facility] PH[facility] THERAP[facility] directly at [phone].  Normal or non-critical lab and imaging results will be communicated to you by MyChart, letter or phone within 4 business days after the clinic has received the results. If you do not hear from us within 7 days, please contact the clinic through [email or web address] or phone. If you have a critical or abnormal lab result, we will notify you by phone as soon as possible.  Submit refill requests through [employer] or call your pharmacy and they will forward the refill request to us. Please allow 3 business days for your refill to be completed.          Additional Information About Your Visit        MyChart Information     [employer] gives you secure access to your electronic health record. If you see a primary care provider, you can also send messages to your care team and make appointments. If you have  questions, please call your primary care clinic.  If you do not have a primary care provider, please call [phone] and they will assist you.        Care EveryWhere ID     This is your Care EveryWhere ID. This could be used by other organizations to access your [place] medical records  [id number]         Blood Pressure from Last 3 Encounters:   No data found for BP    Weight from Last 3 Encounters:   10/18/17 100.3 kg (221 lb 3.2 oz)              We Performed the Following     NEUROMUSCULAR RE-EDUCATION     THERAPEUTIC EXERCISES        Primary Care Provider Office Phone # Fax #    Kavon H [facility] [phone]       [facility] [address] [facility] [address]        Equal Access to Services     JESSICA MORATAYA : Hadii aad ku hadasho Soomaali, waaxda luqadaha, qaybta kaalmada adeegyada, waxay aurorain hayjocelyne fontaine . So [facility] [phone].    ATENCIÓN: Si habla español, tiene a moffett disposición servicios gratuitos de asistencia lingüística. Llame al [phone].    We comply with applicable federal civil rights laws and [place] laws. We do not discriminate on the basis of race, color, national origin, age, disability, sex, sexual orientation, or gender identity.            Thank you!     Thank you for choosing [facility] PH[facility]ERAPY [facility]  for your care. Our goal is always to provide you with excellent care. Hearing back from our patients is one way we can continue to improve our services. Please take a few minutes to complete the written survey that you may receive in the mail after your visit with us. Thank you!             Your Updated Medication List - Protect others around you: Learn how to safely use, store and throw away your medicines at www.disposemymeds.org.      Notice  As of 1/15/2018 11:59 PM    You have not been prescribed any medications.

## 2018-01-19 NOTE — PROGRESS NOTES
Subjective:  HPI                    Objective:  System    Physical Exam    General     ROS    Assessment/Plan:    PROGRESS  REPORT    Progress reporting period is from 1/15/17.       SUBJECTIVE  Jarvis returns to therapy after 3 weeks due to being gone for 3 weeks over winter break.  He reports within the last week has been having some more discomfort in the left shoulder and difficulty raising his arm to get something high.      Current Pain level: 5/10.     Initial Pain level: 10/10.   Changes in function:  Yes, decreased function in his left shoulder  Adverse reaction to treatment or activity: insidious onset of left shoulder pain and decreased ROM    OBJECTIVE  RIght shoulder AROM:     Flexion = 30   ABD = 30   ER = 20.      Let Shoulder:    flexion = 80   ABD = 60   ER = 50.      Positive for scapular winging bilaterally R > L     ASSESSMENT/PLAN  Updated problem list and treatment plan: Diagnosis 1:  Right Shoulder Pain, Brachial Neuritis  Pain -  hot/cold therapy, electric stimulation, manual therapy, splint/taping/bracing/orthotics, self management, education and home program  Decreased ROM/flexibility - manual therapy and therapeutic exercise  Decreased strength - therapeutic exercise and therapeutic activities  Impaired muscle performance - neuro re-education  Decreased function - therapeutic activities  Impaired posture - neuro re-education  STG/LTGs have been met or progress has been made towards goals:  Yes (See Goal flow sheet completed today.)  Assessment of Progress: The patient's condition has exacerbated.  Self Management Plans:  Patient has been instructed in a home treatment program.  I have re-evaluated this patient and find that the nature, scope, duration and intensity of the therapy is appropriate for the medical condition of the patient.  Kavon continues to require the following intervention to meet STG and LTG's:  PT    Recommendations:  This patient would benefit from continued therapy.      Frequency:  2 x month, once daily  Duration:  for 2 months          Please refer to the daily flowsheet for treatment today, total treatment time and time spent performing 1:1 timed codes.

## 2018-01-22 ENCOUNTER — THERAPY VISIT (OUTPATIENT)
Dept: PHYSICAL THERAPY | Facility: CLINIC | Age: 23
End: 2018-01-22
Payer: COMMERCIAL

## 2018-01-22 DIAGNOSIS — G25.89 SCAPULAR DYSKINESIS: ICD-10-CM

## 2018-01-22 DIAGNOSIS — M25.511 SHOULDER PAIN, RIGHT: ICD-10-CM

## 2018-01-22 DIAGNOSIS — M75.41 IMPINGEMENT SYNDROME, SHOULDER, RIGHT: ICD-10-CM

## 2018-01-22 PROCEDURE — 97112 NEUROMUSCULAR REEDUCATION: CPT | Mod: GP | Performed by: PHYSICAL THERAPIST

## 2018-01-25 ENCOUNTER — TELEPHONE (OUTPATIENT)
Dept: ORTHOPEDICS | Facility: CLINIC | Age: 23
End: 2018-01-25

## 2018-01-25 DIAGNOSIS — M89.9 SCAPULAR DYSFUNCTION: Primary | ICD-10-CM

## 2018-01-25 NOTE — TELEPHONE ENCOUNTER
Dr Zapien discussed patient's current symptoms with his physical therapist.  Patient is being treated for winging on the right side and now has developed winging on the contralateral shoulder.    Dr Zapien's recommendation is referral to neurology.  Patient was informed and agreeable with this.  Appointment scheduled.

## 2018-01-29 ENCOUNTER — THERAPY VISIT (OUTPATIENT)
Dept: PHYSICAL THERAPY | Facility: CLINIC | Age: 23
End: 2018-01-29
Payer: COMMERCIAL

## 2018-01-29 DIAGNOSIS — M25.511 SHOULDER PAIN, RIGHT: ICD-10-CM

## 2018-01-29 DIAGNOSIS — G25.89 SCAPULAR DYSKINESIS: ICD-10-CM

## 2018-01-29 DIAGNOSIS — M75.41 IMPINGEMENT SYNDROME, SHOULDER, RIGHT: ICD-10-CM

## 2018-01-29 PROCEDURE — 97110 THERAPEUTIC EXERCISES: CPT | Mod: GP | Performed by: PHYSICAL THERAPIST

## 2018-01-29 PROCEDURE — 97112 NEUROMUSCULAR REEDUCATION: CPT | Mod: GP | Performed by: PHYSICAL THERAPIST

## 2018-02-05 ENCOUNTER — THERAPY VISIT (OUTPATIENT)
Dept: PHYSICAL THERAPY | Facility: CLINIC | Age: 23
End: 2018-02-05
Payer: COMMERCIAL

## 2018-02-05 DIAGNOSIS — M25.511 SHOULDER PAIN, RIGHT: ICD-10-CM

## 2018-02-05 DIAGNOSIS — G25.89 SCAPULAR DYSKINESIS: ICD-10-CM

## 2018-02-05 DIAGNOSIS — M75.41 IMPINGEMENT SYNDROME, SHOULDER, RIGHT: ICD-10-CM

## 2018-02-05 PROCEDURE — 97112 NEUROMUSCULAR REEDUCATION: CPT | Mod: GP | Performed by: PHYSICAL THERAPIST

## 2018-02-05 PROCEDURE — 97110 THERAPEUTIC EXERCISES: CPT | Mod: GP | Performed by: PHYSICAL THERAPIST

## 2018-02-12 ENCOUNTER — THERAPY VISIT (OUTPATIENT)
Dept: PHYSICAL THERAPY | Facility: CLINIC | Age: 23
End: 2018-02-12
Payer: COMMERCIAL

## 2018-02-12 DIAGNOSIS — G25.89 SCAPULAR DYSKINESIS: ICD-10-CM

## 2018-02-12 DIAGNOSIS — M75.41 IMPINGEMENT SYNDROME, SHOULDER, RIGHT: ICD-10-CM

## 2018-02-12 DIAGNOSIS — M25.511 SHOULDER PAIN, RIGHT: ICD-10-CM

## 2018-02-12 PROCEDURE — 97112 NEUROMUSCULAR REEDUCATION: CPT | Mod: GP | Performed by: PHYSICAL THERAPIST

## 2018-02-12 PROCEDURE — 97110 THERAPEUTIC EXERCISES: CPT | Mod: GP | Performed by: PHYSICAL THERAPIST

## 2018-02-27 ENCOUNTER — THERAPY VISIT (OUTPATIENT)
Dept: PHYSICAL THERAPY | Facility: CLINIC | Age: 23
End: 2018-02-27
Payer: COMMERCIAL

## 2018-02-27 DIAGNOSIS — M75.41 IMPINGEMENT SYNDROME, SHOULDER, RIGHT: ICD-10-CM

## 2018-02-27 DIAGNOSIS — G25.89 SCAPULAR DYSKINESIS: ICD-10-CM

## 2018-02-27 DIAGNOSIS — M25.511 SHOULDER PAIN, RIGHT: ICD-10-CM

## 2018-02-27 PROCEDURE — 97110 THERAPEUTIC EXERCISES: CPT | Mod: GP | Performed by: PHYSICAL THERAPY ASSISTANT

## 2018-02-27 PROCEDURE — 97112 NEUROMUSCULAR REEDUCATION: CPT | Mod: GP | Performed by: PHYSICAL THERAPY ASSISTANT

## 2018-03-19 ENCOUNTER — THERAPY VISIT (OUTPATIENT)
Dept: PHYSICAL THERAPY | Facility: CLINIC | Age: 23
End: 2018-03-19
Payer: COMMERCIAL

## 2018-03-19 DIAGNOSIS — M75.41 IMPINGEMENT SYNDROME, SHOULDER, RIGHT: ICD-10-CM

## 2018-03-19 DIAGNOSIS — M25.511 SHOULDER PAIN, RIGHT: ICD-10-CM

## 2018-03-19 DIAGNOSIS — G25.89 SCAPULAR DYSKINESIS: ICD-10-CM

## 2018-03-19 PROCEDURE — 97112 NEUROMUSCULAR REEDUCATION: CPT | Mod: GP | Performed by: PHYSICAL THERAPIST

## 2018-03-19 PROCEDURE — 97110 THERAPEUTIC EXERCISES: CPT | Mod: GP | Performed by: PHYSICAL THERAPIST

## 2018-03-26 ENCOUNTER — OFFICE VISIT (OUTPATIENT)
Dept: NEUROLOGY | Facility: CLINIC | Age: 23
End: 2018-03-26

## 2018-03-26 VITALS
HEART RATE: 115 BPM | WEIGHT: 191.6 LBS | HEIGHT: 74 IN | SYSTOLIC BLOOD PRESSURE: 126 MMHG | DIASTOLIC BLOOD PRESSURE: 90 MMHG | BODY MASS INDEX: 24.59 KG/M2

## 2018-03-26 DIAGNOSIS — G54.5 NEURALGIC AMYOTROPHY: Primary | ICD-10-CM

## 2018-03-26 ASSESSMENT — PAIN SCALES - GENERAL: PAINLEVEL: MODERATE PAIN (4)

## 2018-03-26 NOTE — MR AVS SNAPSHOT
After Visit Summary   3/26/2018    Kavon Mishra    MRN: [id number]           Patient Information     Date Of Birth          1995        Visit Information        Provider Department      3/26/2018 1:30 PM Pablo Maharaj MD [facility] Neurology         Follow-ups after your visit        Follow-up notes from your care team     Return in about 6 months (around 9/26/2018).      Your next 10 appointments already scheduled     Apr 05, 2018 10:00 AM CDT   BRENDAN Extremity with Bennett Daniels PT   [facility] Physical Therapy [facility] ([facility])    [address] [place] [id number] [phone]            Oct 01, 2018  1:00 PM CDT   (Arrive by 12:45 PM)   Return Muscular Dystrophy with Pablo Maharaj MD   [facility] Neurology ([facility])    [address] [place] [id number] [phone]              Who to contact     Please call your clinic at [phone] to:    Ask questions about your health    Make or cancel appointments    Discuss your medicines    Learn about your test results    Speak to your doctor            Additional Information About Your Visit        [email or web address] Information     [employer] gives you secure access to your electronic health record. If you see a primary care provider, you can also send messages to your care team and make appointments. If you have questions, please call your primary care clinic.  If you do not have a primary care provider, please call [phone] and they will assist you.      [employer] is an electronic gateway that provides easy, online access to your medical records. With [employer], you can request a clinic appointment, read your test results, renew a prescription or communicate with your care team.     To access your existing account, please contact your [facility] or call [phone] for assistance.        Care  "EveryWhere ID     This is your Care EveryWhere ID. This could be used by other organizations to access your [facility] medical records  [id number]        Your Vitals Were     Pulse Height BMI (Body Mass Index)             115 1.88 m (6' 2\") 24.6 kg/m2          Blood Pressure from Last 3 Encounters:   03/26/18 126/90    Weight from Last 3 Encounters:   03/26/18 86.9 kg (191 lb 9.6 oz)   10/18/17 100.3 kg (221 lb 3.2 oz)              Today, you had the following     No orders found for display       Primary Care Provider Office Phone # Fax #    Kavon H Man [facility] [phone]       [facility] [address] [address]        Equal Access to Services     JESSICA MORATAYA : Hadii aad ku hadasho Soomaali, waaxda luqadaha, qaybta kaalmada adeegyada, radhika fontaine . So [facility] [phone].    ATENCIÓN: Si habla español, tiene a moffett disposición servicios gratuitos de asistencia lingüística. Llame al [phone].    We comply with applicable federal civil rights laws and [place] laws. We do not discriminate on the basis of race, color, national origin, age, disability, sex, sexual orientation, or gender identity.            Thank you!     Thank you for choosing [facility]UROLOG[facility]  for your care. Our goal is always to provide you with excellent care. Hearing back from our patients is one way we can continue to improve our services. Please take a few minutes to complete the written survey that you may receive in the mail after your visit with us. Thank you!             Your Updated Medication List - Protect others around you: Learn how to safely use, store and throw away your medicines at www.disposemymeds.org.      Notice  As of 3/26/2018  3:04 PM    You have not been prescribed any medications.      "

## 2018-03-26 NOTE — LETTER
3/26/2018       RE: Kavon Mishra  [address] [place] [address]     Dear Colleague,    Thank you for referring your patient, Kavon Mishra, to the [facility] NEUROLOGY at [facility]. Please see a copy of my visit note below.    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  He is accompanied by his father.  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 at the [facility] [employer] and developed generalized myalgias which slowly subsided over the course of 2 weeks but right shoulder discomfort and stiffness persisted. By July 2016 he still had shoulder discomfort. He reports he had an x ray and MRI of this shoulder and no pathology was found to explain his symptoms. He had a dull ache in the right shoulder which was exacerbated with motion.  The degree of pain was probably the worst at onset. At rest it was occasionally there but much less bothersome.  Along with shoulder stiffness, decreased range of motion he developed shoulder weakness.  He uses ibuprofen, Aleve, ice, and/or heat to help the shoulder discomfort.  He has been working with physical therapy and feels that there has been some improvement in his strength. Physical therapy noted that he has right scapular winging. His ROM is limited due to shoulder winging, not pain. In 12/2016 he had an EMG/NCS which revealed right cervical plexus neuropathy. He has not been able to participate in any further  activities.       Prior pertinent laboratory work-up:  None    Prior muscle biopsy:  None    Prior electrophysiologic work-up:  12/2017 EMG/NCS at [facility]  Evidence of acute denervation and decreased recruitment in the right serratus  "anterior >infraspinatus/supraspinatus.  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  Maternal great-grandmother: Possible Parkinson's disease  Mother: Healthy  Father: Healthy  Siblings: Older Brother, Healthy.   Children: None    There is no known family history of myopathy or other neuromuscular disorders.    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 No Tobacco, occasional EtOH. No toxins of illicit drug use.     Medical Allergies:    NKDA     Current Medications:   Multivitamin  B6 100mg  PRN Ibuprofen  PRN Advil    Review of Systems: A complete review of systems was obtained and was negative except for what was noted above.    Answers for HPI/ROS submitted by the patient on 3/20/2018   General Symptoms: No  Skin Symptoms: No  HENT Symptoms: No  EYE SYMPTOMS: No  HEART SYMPTOMS: No  LUNG SYMPTOMS: No  INTESTINAL SYMPTOMS: No  URINARY SYMPTOMS: No  REPRODUCTIVE SYMPTOMS: No  SKELETAL SYMPTOMS: No  BLOOD SYMPTOMS: No  NERVOUS SYSTEM SYMPTOMS: No  MENTAL HEALTH SYMPTOMS: No      Physical examination:    /90 (BP Location: Right arm, Patient Position: Chair, Cuff Size: Adult Regular)  Pulse 115  Ht 1.88 m (6' 2\")  Wt 86.9 kg (191 lb 9.6 oz)  BMI 24.6 kg/m2    There are no carotid bruits.       General Appearance: NAD    Skin: There are no rashes or other skin lesions.    Musculoskeletal:  There is no scoliosis, lordosis, kyphosis, pes cavus, or hammertoes. Right scapular winging.     Neurologic examination:    Mental status:  Patient is alert, attentive, and oriented x 3.  Language is coherent and fluent without dysarthria or aphasia.  Memory, comprehension and ability to follow commands " were intact.       Cranial nerves:  Optic discs were sharp. VFF.  Pupils were round and reacted to light.  Extraocular movements were full. There was no face, jaw, palate or tongue weakness or atrophy. Hearing was grossly intact.  Shoulder shrug was normal.       Motor exam: No atrophy or fasciculations.   Manual muscle testing revealed the following MRC grade muscle power:   Right Left   Neck flexion 5    Neck extension: 5    Forearm external rotation 4 5   Shoulder abduction:  3/ 5 with fixed shoulder 5   Elbow extension: 5 5   Elbow flexion:  5 5   Wrist flexion:  5 5   Wrist extension:  5 5   Finger flexion 5 5   FDI 5 5   APB 5 5   Hip flexion 5 5   Hip extension 5 5   Knee flexion 5 5   Knee extension 5 5   Dorsiflexion 5 5   Plantar flexion 5 5     Complex motor skills revealed normal coordination.  Finger-nose- finger and heel to shin were intact.       Sensory exam revealed normal perception of vibration and soft touch throughout.      Gait was normal.  He was able to walk on his heels, toes and tandem without any difficulty.       Deep tendon reflexes:   Right Left   Triceps 2 2   Biceps 2 2   Brachioradialis 2 2   Knee jerk 2 2   Ankle jerk 2 2   Plantar responses were flexor bilaterally.       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  There was no clear antecedent event to precipitate his symptoms.  He is continued to show improvement in strength and today has minimal weakness with elbow external rotation and shoulder abduction only limited by scapular winging (strength is full when scapula is fixed against the posterior chest wall).  He continues to experience a mild degree of pain, particularly with motion that is most likely as a result of subtle shoulder girdle weakness and not due to acute/subacute inflammatory process.  We discussed the diagnosis, etiology, and  expected prognosis at length with him and his father today.  It it is expected the patient will continue to have improvement in strength over the course of the next 6 months to a year (upwards of 2 years in some cases).  The rate of improvement and whether he will have complete recovery cannot be determined at this point.  He should return in approximately 6 months for reevaluation. Although premature at this stage in his healing process, we did discuss the option of scapular fixation if he continues to have weakness (as a result of scapular winging) after an adequate amount of time has been given for recovery.     Plan:      1. Continue Physical Therapy/Exercises including aquatic therapy.  2.  We have discussed in detail his diagnosis and its impact on his motor function.  While his overall prognosis is very optimistic and he will likely continue to show an improvement at his current time he is limited to perform any significant extraneous physical activity such as can be required by his  service.  Therefore, it has been recommended that he would be continued to be up due to still more meaningful near to normal recovery is achieved.  3. Follow up in 6 months.       Karma Burgos DO  Neuromuscular Medicine Fellow [id number]  [facility] Department of Neurology  Pager # [phone]      I personally examined this patient, reviewed vital signs and pertinent auxiliary test results.  This note details our findings, impression and plan that we formulated together.    I spent total of 60 minutes face-to-face with Kavon Mishra during today's visit. Over 50% of this time was spent counseling the patient and coordinating care. See note for details.      Again, thank you for allowing me to participate in the care of your patient.      Sincerely,    Pablo Maharaj MD

## 2018-03-26 NOTE — PROGRESS NOTES
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  He is accompanied by his father.  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 By July 2016 he still had shoulder discomfort. He reports he had an x ray and MRI of this shoulder and no pathology was found to explain his symptoms. He had a dull ache in the right shoulder which was exacerbated with motion.  The degree of pain was probably the worst at onset. At rest it was occasionally there but much less bothersome.  Along with shoulder stiffness, decreased range of motion he developed shoulder weakness.  He uses ibuprofen, Aleve, ice, and/or heat to help the shoulder discomfort.  He has been working with physical therapy and feels that there has been some improvement in his strength. Physical therapy noted that he has right scapular winging. His ROM is limited due to shoulder winging, not pain. In 12/2016 he had an EMG/NCS which revealed right cervical plexus neuropathy. He has not been able to participate in any further  activities.       Prior pertinent laboratory work-up:  None    Prior muscle biopsy:  None    Prior electrophysiologic work-up:  12/2017 EMG/NCS at [facility]  Evidence of acute denervation and decreased recruitment in the right serratus anterior >infraspinatus/supraspinatus.  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  Maternal great-grandmother: Possible Parkinson's disease  Mother: Healthy  Father: Healthy  Siblings: Older Brother, Healthy.   Children: None    There is no known family history of myopathy or other neuromuscular disorders.    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 No Tobacco, occasional EtOH. No toxins of illicit drug use.     Medical Allergies:    NKDA     Current Medications:   Multivitamin  B6 100mg  PRN Ibuprofen  PRN Advil    Review of Systems: A complete review of systems was obtained and was negative except for what was noted above.    Answers for HPI/ROS submitted by the patient on 3/20/2018   General Symptoms: No  Skin Symptoms: No  HENT Symptoms: No  EYE SYMPTOMS: No  HEART SYMPTOMS: No  LUNG SYMPTOMS: No  INTESTINAL SYMPTOMS: No  URINARY SYMPTOMS: No  REPRODUCTIVE SYMPTOMS: No  SKELETAL SYMPTOMS: No  BLOOD SYMPTOMS: No  NERVOUS SYSTEM SYMPTOMS: No  MENTAL HEALTH SYMPTOMS: No      Physical examination:    /90 (BP Location: Right arm, Patient Position: Chair, Cuff Size: Adult Regular)  Pulse 115  Ht 1.88 m (6' 2\")  Wt 86.9 kg (191 lb 9.6 oz)  BMI 24.6 kg/m2    There are no carotid bruits.       General Appearance: NAD    Skin: There are no rashes or other skin lesions.    Musculoskeletal:  There is no scoliosis, lordosis, kyphosis, pes cavus, or hammertoes. Right scapular winging.     Neurologic examination:    Mental status:  Patient is alert, attentive, and oriented x 3.  Language is coherent and fluent without dysarthria or aphasia.  Memory, comprehension and ability to follow commands were intact.       Cranial nerves:  Optic discs were sharp. VFF.  Pupils were round and reacted to light.  Extraocular movements were full. There was no face, jaw, palate or tongue weakness or atrophy. Hearing was grossly intact.  Shoulder shrug was normal.       Motor exam: No " atrophy or fasciculations.   Manual muscle testing revealed the following MRC grade muscle power:   Right Left   Neck flexion 5    Neck extension: 5    Forearm external rotation 4 5   Shoulder abduction:  3/ 5 with fixed shoulder 5   Elbow extension: 5 5   Elbow flexion:  5 5   Wrist flexion:  5 5   Wrist extension:  5 5   Finger flexion 5 5   FDI 5 5   APB 5 5   Hip flexion 5 5   Hip extension 5 5   Knee flexion 5 5   Knee extension 5 5   Dorsiflexion 5 5   Plantar flexion 5 5     Complex motor skills revealed normal coordination.  Finger-nose- finger and heel to shin were intact.       Sensory exam revealed normal perception of vibration and soft touch throughout.      Gait was normal.  He was able to walk on his heels, toes and tandem without any difficulty.       Deep tendon reflexes:   Right Left   Triceps 2 2   Biceps 2 2   Brachioradialis 2 2   Knee jerk 2 2   Ankle jerk 2 2   Plantar responses were flexor bilaterally.       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  There was no clear antecedent event to precipitate his symptoms.  He is continued to show improvement in strength and today has minimal weakness with elbow external rotation and shoulder abduction only limited by scapular winging (strength is full when scapula is fixed against the posterior chest wall).  He continues to experience a mild degree of pain, particularly with motion that is most likely as a result of subtle shoulder girdle weakness and not due to acute/subacute inflammatory process.  We discussed the diagnosis, etiology, and expected prognosis at length with him and his father today.  It it is expected the patient will continue to have improvement in strength over the course of the next 6 months to a year (upwards of 2 years in some cases).  The rate of improvement and whether he will have complete  recovery cannot be determined at this point.  He should return in approximately 6 months for reevaluation. Although premature at this stage in his healing process, we did discuss the option of scapular fixation if he continues to have weakness (as a result of scapular winging) after an adequate amount of time has been given for recovery.     Plan:      1. Continue Physical Therapy/Exercises including aquatic therapy.  2.  We have discussed in detail his diagnosis and its impact on his motor function.  While his overall prognosis is very optimistic and he will likely continue to show an improvement at his current time he is limited to perform any significant extraneous physical activity such as can be required by his  service.  Therefore, it has been recommended that he would be continued to be up due to still more meaningful near to normal recovery is achieved.  3. Follow up in 6 months.       Karma Burgos DO  Neuromuscular Medicine Fellow [phone]  [facility] Department of Neurology  Pager # [phone]      I personally examined this patient, reviewed vital signs and pertinent auxiliary test results.  This note details our findings, impression and plan that we formulated together.    I spent total of 60 minutes face-to-face with Kavon Mishra during today's visit. Over 50% of this time was spent counseling the patient and coordinating care. See note for details.    Sincerely yours,      Pablo Maharaj MD  Pediatric Neurology  [phone]

## 2018-03-28 PROBLEM — G54.5 NEURALGIC AMYOTROPHY: Status: ACTIVE | Noted: 2018-03-28

## 2018-04-05 ENCOUNTER — THERAPY VISIT (OUTPATIENT)
Dept: PHYSICAL THERAPY | Facility: CLINIC | Age: 23
End: 2018-04-05
Payer: COMMERCIAL

## 2018-04-05 DIAGNOSIS — M75.41 IMPINGEMENT SYNDROME, SHOULDER, RIGHT: ICD-10-CM

## 2018-04-05 DIAGNOSIS — G25.89 SCAPULAR DYSKINESIS: ICD-10-CM

## 2018-04-05 DIAGNOSIS — M25.511 SHOULDER PAIN, RIGHT: ICD-10-CM

## 2018-04-05 PROCEDURE — 97112 NEUROMUSCULAR REEDUCATION: CPT | Mod: GP | Performed by: PHYSICAL THERAPIST

## 2018-04-05 PROCEDURE — 97110 THERAPEUTIC EXERCISES: CPT | Mod: GP | Performed by: PHYSICAL THERAPIST

## 2018-04-26 ENCOUNTER — THERAPY VISIT (OUTPATIENT)
Dept: PHYSICAL THERAPY | Facility: CLINIC | Age: 23
End: 2018-04-26
Payer: COMMERCIAL

## 2018-04-26 DIAGNOSIS — M75.41 IMPINGEMENT SYNDROME, SHOULDER, RIGHT: ICD-10-CM

## 2018-04-26 DIAGNOSIS — G25.89 SCAPULAR DYSKINESIS: ICD-10-CM

## 2018-04-26 DIAGNOSIS — M25.511 SHOULDER PAIN, RIGHT: ICD-10-CM

## 2018-04-26 PROCEDURE — 97112 NEUROMUSCULAR REEDUCATION: CPT | Mod: GP | Performed by: PHYSICAL THERAPIST

## 2018-04-26 PROCEDURE — 97110 THERAPEUTIC EXERCISES: CPT | Mod: GP | Performed by: PHYSICAL THERAPIST

## 2018-04-26 NOTE — PROGRESS NOTES
Subjective:  HPI                    Objective:  System    Physical Exam    General     ROS    Assessment/Plan:    PROGRESS  REPORT    Progress reporting period is from 4/26/18.       SUBJECTIVE  Jarvis has been seen for 17 therapy sessions.  Has been diagnosed by a neurologist with parsonage jennings syndrome.   Exercises are going well.  Still has trouble raising arm but the shoulder has been feeling a little better.  He seems to finally be turning the corner and able to activate his RC and periscapular musculature to the point he and progress.  Current pain level is 3/10  .      Initial Pain level: 10/10.   Changes in function:  Yes (See Goal flowsheet attached for changes in current functional level)  Adverse reaction to treatment or activity: None    OBJECTIVE  Standing active ER:  Right = 35, Left = 55.  ER Strength:  4-/5, Scaption = 2/5, Serratus = 3+/5     ASSESSMENT/PLAN  Updated problem list and treatment plan: Diagnosis 1:  Right Shoulder Pain, Parsonage-Jennings Syndrome  Pain -  self management, education and home program  Decreased ROM/flexibility - manual therapy and therapeutic exercise  Decreased strength - therapeutic exercise and therapeutic activities  Impaired muscle performance - neuro re-education  Decreased function - therapeutic activities  Impaired posture - neuro re-education  STG/LTGs have been met or progress has been made towards goals:  Yes (See Goal flow sheet completed today.)  Assessment of Progress: The patient's condition is improving.  The patient's condition has potential to improve.  Self Management Plans:  Patient has been instructed in a home treatment program.  I have re-evaluated this patient and find that the nature, scope, duration and intensity of the therapy is appropriate for the medical condition of the patient.  Kavon continues to require the following intervention to meet STG and LTG's:  PT    Recommendations:  This patient would benefit from continued therapy.      Frequency:  2 X a month, once daily  Duration:  for 3 months        Please refer to the daily flowsheet for treatment today, total treatment time and time spent performing 1:1 timed codes.

## 2018-04-26 NOTE — MR AVS SNAPSHOT
After Visit Summary   4/26/2018    Kavon Mishra    MRN: [id number]           Patient Information     Date Of Birth          1995        Visit Information        Provider Department      4/26/2018 1:50 PM Bennett Daniels PT [facility]        Today's Diagnoses     Shoulder pain, right        Scapular dyskinesis        Impingement syndrome, shoulder, right           Follow-ups after your visit        Your next 10 appointments already scheduled     May 17, 2018  8:40 AM CDT   [facility] Extremity with LIANNA Cao [facility] ([facility])    [address] [place] [address] [address] [facility] [address] [phone]            Oct 01, 2018  1:00 PM CDT   (Arrive by 12:45 PM)   Return Muscular Dystrophy with Pablo Maharaj MD   [facility] ([facility])    [address] [place] [address] [phone]              Who to contact     If you have questions or need follow up information about today's clinic visit or your schedule please contact [facility] directly at [phone].  Normal or non-critical lab and imaging results will be communicated to you by MyChart, letter or phone within 4 business days after the clinic has received the results. If you do not hear from us within 7 days, please contact the clinic through MyChart or phone. If you have a critical or abnormal lab result, we will notify you by phone as soon as possible.  Submit refill requests through [employer] or call your pharmacy and they will forward the refill request to us. Please allow 3 business days for your refill to be completed.          Additional Information About Your Visit        MyChart Information     [employer] gives you secure access to your electronic health record. If you see a primary care provider, you can also send messages to your care team and make  appointments. If you have questions, please call your primary care clinic.  If you do not have a primary care provider, please call [phone] and they will assist you.        Care EveryWhere ID     This is your Care EveryWhere ID. This could be used by other organizations to access your [place] medical records  [id number]         Blood Pressure from Last 3 Encounters:   03/26/18 126/90    Weight from Last 3 Encounters:   03/26/18 86.9 kg (191 lb 9.6 oz)   10/18/17 100.3 kg (221 lb 3.2 oz)              We Performed the Following     [facility] PROGRESS NOTES REPORT     NEUROMUSCULAR RE-EDUCATION     THERAPEUTIC EXERCISES        Primary Care Provider Office Phone # Fax #    Kavon [facility] [phone]       [facility] [address] [address]        Equal Access to Services     JESSICA MORATAYA : Hadii romelia ku hadasho Soomaali, waaxda luqadaha, qaybta kaalmada adeegyada, radhika luevano hayaan chivo fontaine . So [facility] [phone].    ATENCIÓN: Si habla español, tiene a moffett disposición servicios gratuitos de asistencia lingüística. Llame al [phone].    We comply with applicable federal civil rights laws and [place] laws. We do not discriminate on the basis of race, color, national origin, age, disability, sex, sexual orientation, or gender identity.            Thank you!     Thank you for choosing [facility] PHYSICAL THERAP[facility]  for your care. Our goal is always to provide you with excellent care. Hearing back from our patients is one way we can continue to improve our services. Please take a few minutes to complete the written survey that you may receive in the mail after your visit with us. Thank you!             Your Updated Medication List - Protect others around you: Learn how to safely use, store and throw away your medicines at www.disposemymeds.org.      Notice  As of 4/26/2018  4:57 PM    You have not been prescribed any medications.

## 2018-05-17 ENCOUNTER — THERAPY VISIT (OUTPATIENT)
Dept: PHYSICAL THERAPY | Facility: CLINIC | Age: 23
End: 2018-05-17
Payer: COMMERCIAL

## 2018-05-17 DIAGNOSIS — M25.511 SHOULDER PAIN, RIGHT: ICD-10-CM

## 2018-05-17 DIAGNOSIS — G25.89 SCAPULAR DYSKINESIS: ICD-10-CM

## 2018-05-17 DIAGNOSIS — M75.41 IMPINGEMENT SYNDROME, SHOULDER, RIGHT: ICD-10-CM

## 2018-05-17 PROCEDURE — 97110 THERAPEUTIC EXERCISES: CPT | Mod: GP | Performed by: PHYSICAL THERAPIST

## 2018-05-17 PROCEDURE — 97112 NEUROMUSCULAR REEDUCATION: CPT | Mod: GP | Performed by: PHYSICAL THERAPIST

## 2018-06-04 ENCOUNTER — THERAPY VISIT (OUTPATIENT)
Dept: PHYSICAL THERAPY | Facility: CLINIC | Age: 23
End: 2018-06-04
Payer: COMMERCIAL

## 2018-06-04 DIAGNOSIS — M75.41 IMPINGEMENT SYNDROME, SHOULDER, RIGHT: ICD-10-CM

## 2018-06-04 DIAGNOSIS — G25.89 SCAPULAR DYSKINESIS: ICD-10-CM

## 2018-06-04 DIAGNOSIS — M25.511 SHOULDER PAIN, RIGHT: ICD-10-CM

## 2018-06-04 PROCEDURE — 97110 THERAPEUTIC EXERCISES: CPT | Mod: GP | Performed by: PHYSICAL THERAPIST

## 2018-06-04 PROCEDURE — 97112 NEUROMUSCULAR REEDUCATION: CPT | Mod: GP | Performed by: PHYSICAL THERAPIST

## 2018-06-04 PROCEDURE — 97530 THERAPEUTIC ACTIVITIES: CPT | Mod: GP | Performed by: PHYSICAL THERAPIST

## 2018-06-04 NOTE — MR AVS SNAPSHOT
After Visit Summary   6/4/2018    Kavon Mishra    MRN: [id number]           Patient Information     Date Of Birth          1995        Visit Information        Provider Department      6/4/2018 11:20 AM Bennett Daniels PT [facility]        Today's Diagnoses     Shoulder pain, right        Scapular dyskinesis        Impingement syndrome, shoulder, right           Follow-ups after your visit        Your next 10 appointments already scheduled     Oct 01, 2018  1:00 PM CDT   (Arrive by 12:45 PM)   Return Muscular Dystrophy with Pablo Maharaj MD   [facility] ([facility])    [address] [facility]  [address] [facility] [address] [phone]              Who to contact     If you have questions or need follow up information about today's clinic visit or your schedule please contact [facility] PH[facility] THERAP[facility] directly at [phone].  Normal or non-critical lab and imaging results will be communicated to you by MyChart, letter or phone within 4 business days after the clinic has received the results. If you do not hear from us within 7 days, please contact the clinic through [employer] or phone. If you have a critical or abnormal lab result, we will notify you by phone as soon as possible.  Submit refill requests through [employer] or call your pharmacy and they will forward the refill request to us. Please allow 3 business days for your refill to be completed.          Additional Information About Your Visit        [employer] Information     [employer] gives you secure access to your electronic health record. If you see a primary care provider, you can also send messages to your care team and make appointments. If you have questions, please call your primary care clinic.  If you do not have a primary care provider, please call [phone] and they will assist you.        Care EveryWhere ID     This is your Care EveryWhere ID. This  could be used by other organizations to access your [place] medical records  [id number]         Blood Pressure from Last 3 Encounters:   03/26/18 126/90    Weight from Last 3 Encounters:   03/26/18 86.9 kg (191 lb 9.6 oz)   10/18/17 100.3 kg (221 lb 3.2 oz)              We Performed the Following     [facility] PROGRESS NOTES REPORT     NEUROMUSCULAR RE-EDUCATION     THERAPEUTIC ACTIVITIES     THERAPEUTIC EXERCISES        Primary Care Provider Office Phone # Fax #    Kavon [facility] [phone]       [facility] [address] [facility] [address]        Equal Access to Services     [facility] : Hadii aad ku hadasho Soomaali, waaxda luqadaha, qaybta kaalmada adeegyada, radhika simonin hayaan chivo fontaine . So [facility] [phone].    ATENCIÓN: Si habla español, tiene a moffett disposición servicios gratuitos de asistencia lingüística. Llame al [phone].    We comply with applicable federal civil rights laws and [place] laws. We do not discriminate on the basis of race, color, national origin, age, disability, sex, sexual orientation, or gender identity.            Thank you!     Thank you for choosing [facility] PHYSICAL THERAPY [facility]  for your care. Our goal is always to provide you with excellent care. Hearing back from our patients is one way we can continue to improve our services. Please take a few minutes to complete the written survey that you may receive in the mail after your visit with us. Thank you!             Your Updated Medication List - Protect others around you: Learn how to safely use, store and throw away your medicines at www.disposemymeds.org.      Notice  As of 6/4/2018  4:12 PM    You have not been prescribed any medications.

## 2018-06-04 NOTE — PROGRESS NOTES
Subjective:  HPI                    Objective:  System    Physical Exam    General     ROS    Assessment/Plan:    PROGRESS  REPORT    Progress reporting period is from 6/4/18       SUBJECTIVE  Jarvis's right shoulder has been a little more sore this week but overall he has much improved function.  He has been able to reach overhead in front and out to the side.  Current pain level is 3/10  .     Initial Pain level: 10/10.   Changes in function:  Yes (See Goal flowsheet attached for changes in current functional level)  Adverse reaction to treatment or activity: None    OBJECTIVE  AROM:   Flexion = 140   ER = 70   ABD = 135     Strength:   Scaption = 4/5   ER = 4/5   IR = 4+/5    Positive for scapular winging bilaterally        ASSESSMENT/PLAN  Updated problem list and treatment plan: Diagnosis 1:  Right Shoulder Brachial Neuritis  Pain -  home program  Decreased ROM/flexibility - home program  Decreased strength - home program  Impaired muscle performance - home program  Decreased function - home program  STG/LTGs have been met or progress has been made towards goals:  Yes (See Goal flow sheet completed today.)  Assessment of Progress: The patient's condition is improving.  The patient's condition has potential to improve.  Self Management Plans:  Patient has been instructed in a home treatment program.  I have re-evaluated this patient and find that the nature, scope, duration and intensity of the therapy is appropriate for the medical condition of the patient.  Kavon continues to require the following intervention to meet STG and LTG's:  PT, HEP    Recommendations:  Jarvis will be moving and since he is progressing well we will discharge him and have him continue with his HEP.  I have given him my contact information should he have trouble.  If he does we will have him see a therapist in [place], [place] where he is moving.      Please refer to the daily flowsheet for treatment today, total treatment time and time spent  performing 1:1 timed codes.

## 2019-11-04 ENCOUNTER — HEALTH MAINTENANCE LETTER (OUTPATIENT)
Age: 24
End: 2019-11-04

## 2020-11-16 ENCOUNTER — HEALTH MAINTENANCE LETTER (OUTPATIENT)
Age: 25
End: 2020-11-16

## 2021-09-18 ENCOUNTER — HEALTH MAINTENANCE LETTER (OUTPATIENT)
Age: 26
End: 2021-09-18

## 2022-01-08 ENCOUNTER — HEALTH MAINTENANCE LETTER (OUTPATIENT)
Age: 27
End: 2022-01-08

## 2022-11-20 ENCOUNTER — HEALTH MAINTENANCE LETTER (OUTPATIENT)
Age: 27
End: 2022-11-20

## 2023-04-15 ENCOUNTER — HEALTH MAINTENANCE LETTER (OUTPATIENT)
Age: 28
End: 2023-04-15